# Patient Record
Sex: FEMALE | Race: AMERICAN INDIAN OR ALASKA NATIVE
[De-identification: names, ages, dates, MRNs, and addresses within clinical notes are randomized per-mention and may not be internally consistent; named-entity substitution may affect disease eponyms.]

---

## 2017-11-12 ENCOUNTER — HOSPITAL ENCOUNTER (EMERGENCY)
Dept: HOSPITAL 5 - ED | Age: 22
Discharge: HOME | End: 2017-11-12
Payer: SELF-PAY

## 2017-11-12 VITALS — SYSTOLIC BLOOD PRESSURE: 128 MMHG | DIASTOLIC BLOOD PRESSURE: 51 MMHG

## 2017-11-12 DIAGNOSIS — Z88.1: ICD-10-CM

## 2017-11-12 DIAGNOSIS — L50.9: Primary | ICD-10-CM

## 2017-11-12 PROCEDURE — 99282 EMERGENCY DEPT VISIT SF MDM: CPT

## 2017-11-12 NOTE — EMERGENCY DEPARTMENT REPORT
- General


Chief complaint: Skin Rash


Stated complaint: HIVES


Time Seen by Provider: 11/12/17 19:42


Source: patient


Mode of arrival: Ambulatory


Limitations: No Limitations





- History of Present Illness


Initial comments: 


22-year-old female medical history obesity presents with complaint of episode 

of hives this morning.  Patient states that she broke out in hives on her face 

arms and legs this morning.  Took 2 Benadryl and subsequently subsided.  

Patient states that she felt as though her lips might have been slightly 

swollen denies being on any medications denies any allergies to any specific 

substances denies any new pets clothing cosmetics and denies any specific food 

allergies.  Patient is awake alert and oriented 3 states that hives of gone 

away and is in no apparent distress on clinical exam.  Denies any throat 

discomfort and denies any trouble breathing no audible wheezing or stridor on 

exam.  Speaking in full sentences.


MD complaint: other (hives)


-: This morning


Severity: moderate


Quality: other


Improves with: none


Worsens with: none


Context: none


Associated symptoms: denies other symptoms, itching


Treatments Prior to Arrival: none





- Related Data


 Previous Rx's











 Medication  Instructions  Recorded  Last Taken  Type


 


Doxycycline [Vibramycin CAP] 100 mg PO Q12HR #20 capsule 07/24/15 Unknown Rx


 


metroNIDAZOLE [Flagyl TAB] 500 mg PO Q12HR #20 tab 07/24/15 Unknown Rx


 


Ibuprofen [Motrin 600 MG tab] 600 mg PO Q8H PRN #30 tablet 04/21/16 Unknown Rx


 


Colloidal Oatmeal [Oatmeal Bath] 1 each TP ONCE #1 packet 11/12/17 Unknown Rx


 


EPINEPHrine (NF) [Epipen (Nf)] 0.3 mg IM ONCE PRN #1 syringekit 11/12/17 

Unknown Rx


 


Famotidine [Pepcid] 20 mg PO BID PRN #30 tablet 11/12/17 Unknown Rx


 


Hydroxyzine HCl 25 mg PO TID PRN #30 tablet 11/12/17 Unknown Rx











 Allergies











Allergy/AdvReac Type Severity Reaction Status Date / Time


 


azithromycin [From Zithromax] Allergy  Hives Verified 04/21/16 09:34














Abscess Boil HPI





- HPI


Chief Complaint: Skin Rash


Stated Complaint: HIVES


Time Seen by Provider: 11/12/17 19:42


Home Medications: 


 Previous Rx's











 Medication  Instructions  Recorded  Last Taken  Type


 


Doxycycline [Vibramycin CAP] 100 mg PO Q12HR #20 capsule 07/24/15 Unknown Rx


 


metroNIDAZOLE [Flagyl TAB] 500 mg PO Q12HR #20 tab 07/24/15 Unknown Rx


 


Ibuprofen [Motrin 600 MG tab] 600 mg PO Q8H PRN #30 tablet 04/21/16 Unknown Rx


 


Colloidal Oatmeal [Oatmeal Bath] 1 each TP ONCE #1 packet 11/12/17 Unknown Rx


 


EPINEPHrine (NF) [Epipen (Nf)] 0.3 mg IM ONCE PRN #1 syringekit 11/12/17 

Unknown Rx


 


Famotidine [Pepcid] 20 mg PO BID PRN #30 tablet 11/12/17 Unknown Rx


 


Hydroxyzine HCl 25 mg PO TID PRN #30 tablet 11/12/17 Unknown Rx











Allergies/Adverse Reactions: 


 Allergies











Allergy/AdvReac Type Severity Reaction Status Date / Time


 


azithromycin [From Zithromax] Allergy  Hives Verified 04/21/16 09:34














ED Review of Systems


ROS: 


Stated complaint: HIVES


Other details as noted in HPI





Constitutional: denies: chills, fever


Eyes: denies: eye pain, eye discharge, vision change


ENT: denies: ear pain, throat pain


Respiratory: denies: cough, shortness of breath, wheezing


Cardiovascular: denies: chest pain, palpitations


Endocrine: no symptoms reported


Gastrointestinal: denies: abdominal pain, nausea, diarrhea


Genitourinary: denies: urgency, dysuria, discharge


Musculoskeletal: denies: back pain, joint swelling, arthralgia


Skin: pruritus.  denies: rash, lesions


Neurological: denies: headache, weakness, paresthesias


Psychiatric: denies: anxiety, depression


Hematological/Lymphatic: denies: easy bleeding, easy bruising





ED Past Medical Hx





- Past Medical History


Previous Medical History?: Yes


Additional medical history: Eczema





- Surgical History


Past Surgical History?: No





- Social History


Smoking Status: Never Smoker





- Medications


Home Medications: 


 Home Medications











 Medication  Instructions  Recorded  Confirmed  Last Taken  Type


 


Doxycycline [Vibramycin CAP] 100 mg PO Q12HR #20 capsule 07/24/15  Unknown Rx


 


metroNIDAZOLE [Flagyl TAB] 500 mg PO Q12HR #20 tab 07/24/15  Unknown Rx


 


Ibuprofen [Motrin 600 MG tab] 600 mg PO Q8H PRN #30 tablet 04/21/16  Unknown Rx


 


Colloidal Oatmeal [Oatmeal Bath] 1 each TP ONCE #1 packet 11/12/17  Unknown Rx


 


EPINEPHrine (NF) [Epipen (Nf)] 0.3 mg IM ONCE PRN #1 syringekit 11/12/17  

Unknown Rx


 


Famotidine [Pepcid] 20 mg PO BID PRN #30 tablet 11/12/17  Unknown Rx


 


Hydroxyzine HCl 25 mg PO TID PRN #30 tablet 11/12/17  Unknown Rx














ED Physical Exam





- General


Limitations: No Limitations


General appearance: alert, in no apparent distress





- Head


Head exam: Present: atraumatic, normocephalic





- Eye


Eye exam: Present: normal appearance, PERRL, EOMI





- ENT


ENT exam: Present: mucous membranes moist





- Neck


Neck exam: Present: normal inspection





- Respiratory


Respiratory exam: Present: normal lung sounds bilaterally.  Absent: respiratory 

distress





- Cardiovascular


Cardiovascular Exam: Present: regular rate, normal rhythm.  Absent: systolic 

murmur, diastolic murmur, rubs, gallop





- GI/Abdominal


GI/Abdominal exam: Present: soft, normal bowel sounds





- Extremities Exam


Extremities exam: Present: normal inspection





- Back Exam


Back exam: Present: normal inspection





- Neurological Exam


Neurological exam: Present: alert, oriented X3, CN II-XII intact





- Psychiatric


Psychiatric exam: Present: normal affect, normal mood





- Skin


Skin exam: Present: warm, dry, intact, normal color.  Absent: rash





ED Course


 Vital Signs











  11/12/17 11/12/17





  17:27 20:46


 


Temperature 98 F 98.7 F


 


Pulse Rate 91 H 90


 


Respiratory 20 16





Rate  


 


Blood Pressure 157/89 


 


Blood Pressure  128/51





[Right]  


 


O2 Sat by Pulse 99 97





Oximetry  














ED Medical Decision Making





- Medical Decision Making


A/P: Hives, allergic reaction


1- pepcid prn, hydrozyine prn, epi-pen prn


2- patient has no clinical signs of angioedema or anaphylaxis at this time.  No 

visible facial swelling oropharynx patent vital signs are stable patient is 

speaking full sentences no dyspnea no audible wheezing or stridor


3- referred patient to dermatologist/allergist and primary care





Critical care attestation.: 


If time is entered above; I have spent that time in minutes in the direct care 

of this critically ill patient, excluding procedure time.








ED Disposition


Clinical Impression: 


 Hives





Disposition: DC-01 TO HOME OR SELFCARE


Is pt being admited?: No


Does the pt Need Aspirin: No


Condition: Stable


Instructions:  Urticaria (ED)


Additional Instructions: 


[email or web address]


Prescriptions: 


Colloidal Oatmeal [Oatmeal Bath] 1 each TP ONCE #1 packet


EPINEPHrine (NF) [Epipen (Nf)] 0.3 mg IM ONCE PRN #1 syringekit


 PRN Reason: Angioedema


Famotidine [Pepcid] 20 mg PO BID PRN #30 tablet


 PRN Reason: Allergic Reaction


Hydroxyzine HCl 25 mg PO TID PRN #30 tablet


 PRN Reason: Allergic Reaction


Referrals: 


[facility] [Outside] - 3-5 Days


[facility] [Outside] - 3-5 Days


[facility] [Provider Group] - 3-5 Days


Forms:  Accompanied Note, Work/School Release Form(ED)


Time of Disposition: 20:27

## 2018-01-06 ENCOUNTER — HOSPITAL ENCOUNTER (EMERGENCY)
Dept: HOSPITAL 5 - ED | Age: 23
Discharge: HOME | End: 2018-01-06
Payer: COMMERCIAL

## 2018-01-06 VITALS — DIASTOLIC BLOOD PRESSURE: 85 MMHG | SYSTOLIC BLOOD PRESSURE: 156 MMHG

## 2018-01-06 DIAGNOSIS — A60.03: Primary | ICD-10-CM

## 2018-01-06 DIAGNOSIS — Z88.1: ICD-10-CM

## 2018-01-06 LAB
BILIRUB UR QL STRIP: (no result)
BLOOD UR QL VISUAL: (no result)
MUCOUS THREADS #/AREA URNS HPF: (no result) /HPF
NITRITE UR QL STRIP: (no result)
PH UR STRIP: 6 [PH] (ref 5–7)
PROT UR STRIP-MCNC: (no result) MG/DL
RBC #/AREA URNS HPF: 5 /HPF (ref 0–6)
UROBILINOGEN UR-MCNC: 2 MG/DL (ref ?–2)
WBC #/AREA URNS HPF: 17 /HPF (ref 0–6)

## 2018-01-06 PROCEDURE — 87086 URINE CULTURE/COLONY COUNT: CPT

## 2018-01-06 PROCEDURE — 87591 N.GONORRHOEAE DNA AMP PROB: CPT

## 2018-01-06 PROCEDURE — 81025 URINE PREGNANCY TEST: CPT

## 2018-01-06 PROCEDURE — 99283 EMERGENCY DEPT VISIT LOW MDM: CPT

## 2018-01-06 PROCEDURE — 81001 URINALYSIS AUTO W/SCOPE: CPT

## 2018-01-06 PROCEDURE — 96372 THER/PROPH/DIAG INJ SC/IM: CPT

## 2018-01-06 NOTE — EMERGENCY DEPARTMENT REPORT
ED Female  HPI





- General


Chief complaint: Urogenital-Female


Stated complaint: BURNING WITH URINATION


Time Seen by Provider: 01/06/18 21:07


Source: patient


Mode of arrival: Ambulatory


Limitations: No Limitations





- History of Present Illness


Initial comments: 


22-year-old female past medical history obesity presents with complaint of 2-3 

days of vaginal discharge and uncomfortable vaginal wall lesions on her labia.  

Patient denies fevers chills nausea or vomiting.  Patient is sexually active 

intermittently uses protection.  Patient awake alert and oriented 3 not in 

acute distress and nontoxic-appearing.  Denies dysuria hematuria or increased 

urinary frequency.  States her sex partner informed her that she may have an 

sTd exposure.  States she has a whitish vaginal discharge


MD Complaint: possible STD, other


Onset/Timing: 3


-: days(s)


Location: labia


Quality: other (itching)


Consistency: constant


Improves with: none


Are you Pregnant Now?: No


Last Menstrual Period: 12/15/17


EDC: 09/21/18


Associated Symptoms: vaginal discharge





- Related Data


Sexually active: Yes


 Previous Rx's











 Medication  Instructions  Recorded  Last Taken  Type


 


Doxycycline [Vibramycin CAP] 100 mg PO Q12HR #20 capsule 07/24/15 Unknown Rx


 


metroNIDAZOLE [Flagyl TAB] 500 mg PO Q12HR #20 tab 07/24/15 Unknown Rx


 


Ibuprofen [Motrin 600 MG tab] 600 mg PO Q8H PRN #30 tablet 04/21/16 Unknown Rx


 


Colloidal Oatmeal [Oatmeal Bath] 1 each TP ONCE #1 packet 11/12/17 Unknown Rx


 


EPINEPHrine (NF) [Epipen (Nf)] 0.3 mg IM ONCE PRN #1 syringekit 11/12/17 

Unknown Rx


 


Famotidine [Pepcid] 20 mg PO BID PRN #30 tablet 11/12/17 Unknown Rx


 


Hydroxyzine HCl 25 mg PO TID PRN #30 tablet 11/12/17 Unknown Rx


 


Acyclovir [Zovirax Tab] 400 mg PO Q8H #21 tab 01/06/18 Unknown Rx


 


Doxycycline [Vibramycin CAP] 100 mg PO Q12HR #14 capsule 01/06/18 Unknown Rx


 


metroNIDAZOLE [Metronidazole] 500 mg PO BID #14 tablet 01/06/18 Unknown Rx











 Allergies











Allergy/AdvReac Type Severity Reaction Status Date / Time


 


azithromycin [From Zithromax] Allergy  Hives Verified 01/06/18 12:54














ED Review of Systems


ROS: 


Stated complaint: BURNING WITH URINATION


Other details as noted in HPI





Constitutional: denies: chills, fever


Eyes: denies: eye pain, eye discharge, vision change


ENT: denies: ear pain, throat pain


Respiratory: denies: cough, shortness of breath, wheezing


Cardiovascular: denies: chest pain, palpitations


Endocrine: no symptoms reported


Gastrointestinal: denies: abdominal pain, nausea, diarrhea


Genitourinary: discharge, other (vaginal lesions).  denies: urgency, dysuria


Musculoskeletal: denies: back pain, joint swelling, arthralgia


Skin: denies: rash, lesions


Neurological: denies: headache, weakness, paresthesias


Psychiatric: denies: anxiety, depression


Hematological/Lymphatic: denies: easy bleeding, easy bruising





ED Past Medical Hx





- Past Medical History


Previous Medical History?: No


Additional medical history: Eczema





- Surgical History


Past Surgical History?: No





- Social History


Smoking Status: Never Smoker


Substance Use Type: None





- Medications


Home Medications: 


 Home Medications











 Medication  Instructions  Recorded  Confirmed  Last Taken  Type


 


Doxycycline [Vibramycin CAP] 100 mg PO Q12HR #20 capsule 07/24/15  Unknown Rx


 


metroNIDAZOLE [Flagyl TAB] 500 mg PO Q12HR #20 tab 07/24/15  Unknown Rx


 


Ibuprofen [Motrin 600 MG tab] 600 mg PO Q8H PRN #30 tablet 04/21/16  Unknown Rx


 


Colloidal Oatmeal [Oatmeal Bath] 1 each TP ONCE #1 packet 11/12/17  Unknown Rx


 


EPINEPHrine (NF) [Epipen (Nf)] 0.3 mg IM ONCE PRN #1 syringekit 11/12/17  

Unknown Rx


 


Famotidine [Pepcid] 20 mg PO BID PRN #30 tablet 11/12/17  Unknown Rx


 


Hydroxyzine HCl 25 mg PO TID PRN #30 tablet 11/12/17  Unknown Rx


 


Acyclovir [Zovirax Tab] 400 mg PO Q8H #21 tab 01/06/18  Unknown Rx


 


Doxycycline [Vibramycin CAP] 100 mg PO Q12HR #14 capsule 01/06/18  Unknown Rx


 


metroNIDAZOLE [Metronidazole] 500 mg PO BID #14 tablet 01/06/18  Unknown Rx














ED Physical Exam





- General


Limitations: No Limitations


General appearance: alert, in no apparent distress





- Head


Head exam: Present: atraumatic, normocephalic





- Eye


Eye exam: Present: normal appearance, PERRL, EOMI





- ENT


ENT exam: Present: mucous membranes moist





- Neck


Neck exam: Present: normal inspection





- Respiratory


Respiratory exam: Present: normal lung sounds bilaterally.  Absent: respiratory 

distress





- Cardiovascular


Cardiovascular Exam: Present: regular rate, normal rhythm.  Absent: systolic 

murmur, diastolic murmur, rubs, gallop





- GI/Abdominal


GI/Abdominal exam: Present: soft, normal bowel sounds





- 


External exam: Present: other (vaginal herpetic lesions/ulcerations)


Speculum exam: Present: vaginal discharge (white vaginal discharge)


Bi-manual exam: Present: normal bi-manual exam





- Extremities Exam


Extremities exam: Present: normal inspection





- Back Exam


Back exam: Present: normal inspection





- Neurological Exam


Neurological exam: Present: alert, oriented X3





- Psychiatric


Psychiatric exam: Present: normal affect, normal mood





- Skin


Skin exam: Present: warm, dry, intact, normal color.  Absent: rash





ED Course


 Vital Signs











  01/06/18 01/06/18





  12:50 21:27


 


Temperature 98.7 F 98.2 F


 


Pulse Rate 99 H 94 H


 


Respiratory 16 18





Rate  


 


Blood Pressure 122/76 


 


Blood Pressure  156/85





[Right]  


 


O2 Sat by Pulse 96 100





Oximetry  














ED Medical Decision Making





- Medical Decision Making


A/P: Clinical genital herpes infection, cervicitis, possible BV


1-patient empirically treated with  ceftriaxone and doxy as patient is allergic 

to azithromycin.  Empiric treatment with acyclovir for genital herpes infection 

based on clinical exam


2-GC cultures sent


3-patient given follow-up with primary care + OB/GYN


4- course of doxycycline, course of metronidazole








Critical care attestation.: 


If time is entered above; I have spent that time in minutes in the direct care 

of this critically ill patient, excluding procedure time.








ED Disposition


Clinical Impression: 


 Herpes genitalis in women, Cervicitis, Bacterial vaginosis





Disposition: DC-01 TO HOME OR SELFCARE


Is pt being admited?: No


Does the pt Need Aspirin: No


Condition: Stable


Instructions:  Acyclovir (By mouth), Cervicitis (ED), Genital Herpes Simplex (ED

), Chlamydia Infection (ED), Safe Sex (ED), Gonococcal Urethritis (ED), 

Bacterial Vaginosis (ED)


Prescriptions: 


Acyclovir [Zovirax Tab] 400 mg PO Q8H #21 tab


Doxycycline [Vibramycin CAP] 100 mg PO Q12HR #14 capsule


metroNIDAZOLE [Metronidazole] 500 mg PO BID #14 tablet


Referrals: 


[facility] [Outside] - 3-5 Days


[facility] [Outside] - 3-5 Days


[facility] [Provider Group] - 3-5 Days


Forms:  Work/School Release Form(ED), STI Treatment and Prevention


Time of Disposition: 21:20